# Patient Record
(demographics unavailable — no encounter records)

---

## 2024-11-20 NOTE — HISTORY OF PRESENT ILLNESS
[FreeTextEntry1] : Here for follow up. She reports HERBERT on walking in the park, needs to stop 4-5 times to catch her breath. No CP. + intermittent palps that are brief and rapid. No dizziness. No neurological symptoms. She is concerned that her ILR may be obscuring part of left breast on imaging for breast cancer screening. ILR reports reviewed.

## 2024-11-20 NOTE — DISCUSSION/SUMMARY
[FreeTextEntry1] : EP follow up recommended to review and remove ILR. Exercise nuc stress test to eval for evid of myocardial ischemia Contin current meds incl aspirin and statin.  [EKG obtained to assist in diagnosis and management of assessed problem(s)] : EKG obtained to assist in diagnosis and management of assessed problem(s)

## 2024-12-06 NOTE — ASSESSMENT
[FreeTextEntry1] : The patient is referred for initial consultation for osteoporosis, 12/06/2024.  Osteoporosis - H/o L Breast cancer 2006 (age 46) s/p lumpectomy, RT. Pt was on Tamoxifen x5 years. Post menopausal age 49-50. Switched to Anastrozole but only took for x6 months due to significant aches and pains. Mammogram: UTD including MRI.  - Lung cancer incidentally found on MRI 2018 s/p RUL lobectomy and chemo. - Patient has been told of osteopenia in 2018, drifted to osteoporosis 2024. - L sesamoid fx 8 years ago playing [employer] ball - Denies fhx of osteoporosis  - Supplements with multivitamin and vit D3.  - BMD results were discussed with the patient.   Given the patient's history and BMD, the patient is at an increased risk of future fracture. Options of medical therapy were discussed in detail including risks and benefits.   I discussed Rx with bisphosphonate therapy, including IV Reclast. Risks and benefits of bisphosphonate therapy were discussed including minor aches and pains, heartburn, gastroesophageal irritation (excluding IV Reclast), osteonecrosis of the jaw (ONJ), atypical femur fractures, and acute phase reaction (w/ IV Reclast only). The patient would benefit from bisphosphonate therapy with the expectation of a drug holiday within 3-5 years.   I discussed Rx with twice a year Prolia injection. Risks and benefits of Prolia discussed including ONJ and atypical femur fracture. I discussed that the patient cannot stop Prolia without expecting rapid bone loss and an increase in risk for future fracture unless they transition to another Rx. Furthermore, there is 10-year safety data for Prolia.   All questions were answered. Rx information handout provided. The patient understands and elects to start Fosamax.   I discussed that weight bearing exercises, cardiovascular activities, and diet are beneficial to overall health, but are not adequate for bone density increase or alternative to osteoporosis medication.  Follow up in 3-4 months, virtual OK. Repeat BMD in 1 year after starting therapy.   Scott MS, Erlinda SL, Nicol KL, Iris EM, Jed KG,  AJ, Niraj ES. The clinician's guide to prevention and treatment of osteoporosis. Osteoporosis Int. 2022 Oct;33(10):6969-1897.

## 2024-12-06 NOTE — REASON FOR VISIT
[Consultation] : a consultation visit [Osteoporosis] : osteoporosis [Spouse] : spouse [FreeTextEntry2] : Dr. Sunni Fox

## 2024-12-06 NOTE — PHYSICAL EXAM
[Alert] : alert [No Acute Distress] : no acute distress [Normal Sclera/Conjunctiva] : normal sclera/conjunctiva [EOMI] : extra ocular movement intact [No Proptosis] : no proptosis [No Respiratory Distress] : no respiratory distress [No Accessory Muscle Use] : no accessory muscle use [Normal Rate] : heart rate was normal [No Spinal Tenderness] : no spinal tenderness [No Stigmata of Cushings Syndrome] : no stigmata of Cushings Syndrome [Normal Gait] : normal gait [Normal Strength/Tone] : muscle strength and tone were normal [No Tremors] : no tremors [Oriented x3] : oriented to person, place, and time

## 2024-12-06 NOTE — HISTORY OF PRESENT ILLNESS
[FreeTextEntry1] : CHIEF COMPLAINT: Osteoporosis  HISTORY OF PRESENT ILLNESS:   Patient has been told of osteopenia in 2018, drifted to osteoporosis 2024. I do not have all the bone density data / images available.   Diagnosed with L Breast cancer 2006 (age 46) s/p lumpectomy, RT. Pt was on Tamoxifen x5 years. Post menopausal age 49-50. Switched to Anastrozole but only took for x6 months due to significant aches and pains. Mammogram: UTD including MRI. Lung cancer incidentally found on MRI 2018 s/p RUL lobectomy and chemo. LN -.  Active lifestyle. Exercise: walks 2-3 miles a few times a week, gym, bike rides Fractures: L sesamoid fx 8 years ago playing [employer] ball Frequent falls: denies Assistive device: denies Fhx: denies osteoporosis, parental hip fracture.  Calcium: cheese, yogurt, milk in cereal, almonds. Multivitamin daily.  Vit D: supplements daily, unsure of exact dose. IBS-C, UTD colonoscopy, reportedly normal. Denies history of kidney stones, excessive alcohol intake, excessive soda intake, celiac disease, malabsorption, nutritional deficiencies, GIB, stomach ulcers.   Denies active smoking.   PMHx CVA 2022 unknown cause of clot, tried to close, PFO ASA 81 mg loop recorder PVCs, needs battery change vs removal Denies Paget's Dz, hyperparathyroidism, diabetes, CVD, blood clots, and chronic steroid use.   Sees DDS every 6 months. No major dental work planned.

## 2024-12-06 NOTE — PROCEDURE
[FreeTextEntry1] : Bone Mineral Density: 11/1/2024 (R) Indication: vs 2021 Spine (L1, L2, L3, L4): -2.7, osteoporosis, (-9.2%) Total hip: L -1.5, osteopenia, (-5.5%) / R -1.7, osteopenia, (-5.9%) Femoral neck: L -2.2, osteopenia / R -2.3, osteopenia Proximal radius: not measured  BMD 9/2018 LS -1.6, L TH -1.2, R TH -1.3, L FN -1.5, R FN -1.9

## 2024-12-10 NOTE — DISCUSSION/SUMMARY
[FreeTextEntry1] : In summary, this is a 64-year-old woman with recent CVA and evidence of PFO on MARLO. She has no residual deficits. She had no evidence of AF in the hospital. She s/p ILR placement.  She has had no episodes of A-fib on her loop recorder.  Her breast surgeon is requesting for her loop recorder to be removed given her history of breast cancer in order to have her undergo further imaging.  Given the fact that her battery life is almost at end-of-life, and that she has had no evidence of atrial fibrillation, I think she would benefit from the ILR removal.   Mrs. Baugh appeared to understand the whole discussion and verbalized that all of her questions were answered to her satisfaction.   Thank you for allowing me to be involved in the care of this pleasant woman. Please feel free to contact me with any questions. [EKG obtained to assist in diagnosis and management of assessed problem(s)] : EKG obtained to assist in diagnosis and management of assessed problem(s)

## 2024-12-10 NOTE — CARDIOLOGY SUMMARY
[de-identified] : 2/20/23: Sinus tachycardia at 107 bpm 12/10/2024: Sinus rhythm at 78 bpm, rsR' [de-identified] : 2/23/23 TTE: Normal LV/RV, normal LA/RA. No significant valvular disease. \par  3/6/23 MARLO: Normal LV/RV, no significant valvular disease, evidence of PFO, no thrombus.\par

## 2024-12-10 NOTE — HISTORY OF PRESENT ILLNESS
[FreeTextEntry1] : Referring Physician: Nir Mays MD  Dear Dr. Mays:   Mrs. Baugh was seen in the [facility] today. For our records, please allow me to summarize the history and my findings.   This pleasant 64-year-old woman has a cardiovascular history significant for CVA. She presented to the hospital with left upper extremity weakness and paraesthesias, facial droop, and altered mental status. She was found on MRI to have multiple acute strokes in the right MCA territory, consistent with an embolic event. Her deficits completely resolved. She had no evidence of AF in the hospital. She was discharged on DAPT and high dose statin. She had a MARLO done recently which revealed a PFO.  She underwent ILR placement. She has been doing well since.  He has not had any atrial fibrillation on her device.  Her device battery is close to end-of-life.  She has a history of breast cancer and needs a breast MRI and mammogram.  Her breast surgeon is very concerned that the loop recorder is going to get in the way due to artifact.  She is requesting for the loop recorder to be removed.   Mrs. Baugh denies any recent history of chest pain, shortness of breath, palpitations, dizziness, or syncope.

## 2025-01-14 NOTE — DISCUSSION/SUMMARY
[FreeTextEntry1] : Contin current meds. Reassurance. Follow up 3 mo encouraged inc cardio exercise to improved aerobic conditioning and to call if symptoms worsen or change.

## 2025-01-14 NOTE — HISTORY OF PRESENT ILLNESS
[FreeTextEntry1] : Here for follow up. Recent nuc stress report reviewed with patient and her son in detail. No evid of ischemia. ILR now out. She continues to have intermittent shortness of breath - occurs at rest like during conversation she feels like she needs to take a deep breath. Also while walking has HERBERT.  She has been doing less cardio in winter months. Has chronic musculoskeletal type CP, positional.

## 2025-01-14 NOTE — ASSESSMENT
[FreeTextEntry1] : dyspnea may be multifactorial incl anxiety when at rest, and combination of deconditioning and lung disease with exertion.

## 2025-02-05 NOTE — PHYSICAL EXAM
[FreeTextEntry1] : Constitutional:  Patient was well-developed, well-nourished and in no acute distress.   Head:  Normocephalic, atraumatic. Tympanic membranes were clear.   Neck:  Supple with full range of motion.   Cardiovascular:  Cardiac rhythm was regular without murmur. There were no carotid bruits. Peripheral pulses were full and symmetric.   Respiratory:  Lungs were clear.   Abdomen:  Soft and nontender.   Spine:  Nontender.   Skin:  There were no rashes.   NEUROLOGICAL EXAMINATION:  Mental Status: Patient was alert and oriented. Speech was fluent. There was no dysarthria.   Cranial Nerves:   II: She could finger count bilaterally. Pupils were equal and reactive. Visual fields were full.  Fundi were not visualized.  III, IV, VI:  Eye movements were full without nystagmus.   V: Facial sensation was intact.   VII: Facial strength was normal.   VIII: Hearing was equal.   IX, X: Palatal movement was normal. Phonation was normal.   XI: Sternocleidomastoids and trapezii were normal.   XII: Tongue was midline and movements normal. There was no lingual atrophy or fasciculations.   Motor Examination: Muscle bulk, tone and strength were normal.  Fine finger and rapid alternating movements were normal.  There was a fine bilateral postural tremor in her hands.  There were mild endpoint tremors.  There were no rest tremors.  Sensory Examination: Pinprick, vibration and joint position sense were intact.   Reflexes: DTRs were 2+ throughout.   Plantar Responses: Plantar responses were flexor.   Coordination/Cerebellar Function: There was no dysmetria on finger to nose or heel to shin testing.   Gait/Stance: Gait was normal. Romberg was negative.  Tandem was mildly unsteady.

## 2025-02-05 NOTE — HISTORY OF PRESENT ILLNESS
[FreeTextEntry1] : Mrs. Jamilah Baugh returned to the office having been last seen on October 10, 2023.  She is a 64-year-old right-handed patient who underwent a left lumpectomy followed by radiation and tamoxifen for breast cancer in 2006.  In 2018, she underwent a left upper lobectomy for presumed adenocarcinoma with local spread.  She was treated with chemotherapy possibly including carboplatin.  She underwent serial scans without evidence of recurrence.  She is under the care of a [facility] oncologist Nir Freitas.  She was well until 2023 when she developed sudden onset of blurred vision and vertical diplopia.  Her symptoms resolved for the most part over 10 minutes although she felt that her right arm was lagging.  She had a mild right frontal headache.  She was evaluated in the emergency room and underwent a CT of the brain which was unremarkable.  She was discharged.  On 2023, while trying on skin goggles, she experienced sudden mental slowness.  She did not appear to be answering appropriately.  Her symptoms resolved.  On , she experienced difficulty dressing and then developed left arm weakness and numbness.  She was transported to [facility].  CT of the brain with contrast revealed a moderate ischemic penumbra in the right frontoparietal region.  CT angiography revealed a 40 to 55% stenosis of the M3 branches in the superior division.  There was no other large vessel occlusive disease.  She was treated with clopidogrel and aspirin.  Her symptoms remitted.  She had mild residual left hand tingling.  She had right-sided headache and felt a bit foggy.   MRI of the brain revealed multiple small acute right middle cerebral artery infarcts.  Echocardiogram revealed a minimally thickened mitral valve with trace mitral regurgitation and tricuspid regurgitation.  There was a questionable intra-atrial septal aneurysm.  MARLO revealed a patent foramen ovale.  She has an implanted loop recorder which has not revealed evidence of paroxysmal atrial fibrillation.  She is scheduled to see Dr. Jorge Iqbal on  regarding possible PFO closure.  At her 2023 visit, she complained of occasional brief stabbing pains on the right side of her head possibly once a week lasting up to a minute.  She denied any sensory or motor symptoms but has been noted to sometimes cradled her left arm against her chest.  An MRI of the brain performed in March revealed evolving right-sided strokes.  MRA of the brain was negative.  In 2023, she underwent an unsuccessful PFO closure.  It was decided to treat her medically with aspirin and rosuvastatin instead.  She has been free of cerebrovascular symptoms since that time.  Her implanted loop recorder was read removed after 19 months without any definite evidence of paroxysmal atrial fibrillation.  A few months ago, she underwent an exercise nuclear stress test.  There was a report of a brief bout of atrial fibrillation during that procedure but it was never confirmed.  She complains of an action tremor in her right hand.  She denies tremor of her voice or head.  Her maternal half brother suffers from tremor as does her son.  Her father and sister do not suffer from tremor.  Her tremor is aggravated by use of her inhaler.  Past surgical history is notable for right upper lobectomy, left lumpectomy and  section.  She suffers from palpitations, IBS and hyperlipidemia.  There is no history of hypertension, diabetes, coronary, renal, hepatic, thyroid or hematologic disease.  She has an allergy to Ceclor.  Medications include Anoro Ellipta, rosuvastatin 40 mg daily, metoprolol 25 mg daily and aspirin 81 mg/day.  She is a remote smoker but was exposed to much secondhand smoke.  She is a social drinker.  She works as a practice manager and is .  Family history is notable for mother with lung cancer, hyperlipidemia and possibly coronary disease.  Her father suffered from hyperlipidemia and bladder cancer.  Her brother suffers from coronary disease.

## 2025-02-05 NOTE — CONSULT LETTER
[Dear  ___] : Dear  [unfilled], [Consult Letter:] : I had the pleasure of evaluating your patient, [unfilled]. [Please see my note below.] : Please see my note below. [Consult Closing:] : Thank you very much for allowing me to participate in the care of this patient.  If you have any questions, please do not hesitate to contact me. [Sincerely,] : Sincerely, [FreeTextEntry3] : Adrian Fry MD\par   [DrGiles  ___] : Dr. MATT

## 2025-02-05 NOTE — REVIEW OF SYSTEMS
[Decr. Concentrating Ability] : decreased concentrating ability [Numbness] : numbness [Tingling] : tingling [Negative] : Heme/Lymph

## 2025-02-05 NOTE — ASSESSMENT
[FreeTextEntry1] : Mrs. Gramajo is a 64-year-old with a remote history of breast and lung cancer.  She has a history of cryptogenic strokes for which she is maintained on aspirin and rosuvastatin without recent recurrence of symptoms.  She underwent an attempted but unsuccessful PFO closure.  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  I suggested a routine follow-up visit in fall 2025.  If her tremor worsens, symptomatic treatment can be considered with increasing her beta-blocker or addition of primidone.  Further management will depend upon her clinical course.

## 2025-03-18 NOTE — ASSESSMENT
Retinal tear and detachment warning symptoms reviewed and patient instructed to call immediately if increasing floaters, flashes, or decreasing peripheral vision. [FreeTextEntry1] : Initial consultation for osteoporosis, 12/06/2024. Seen for follow up.  Osteoporosis - H/o L Breast cancer 2006 (age 46) s/p lumpectomy, RT. Pt was on Tamoxifen x5 years. Post menopausal age 49-50. Switched to Anastrozole but only took for x6 months due to significant aches and pains. Mammogram: UTD including MRI.  - Lung cancer incidentally found on MRI 2018 s/p RUL lobectomy and chemo. - Patient has been told of osteopenia in 2018, drifted to osteoporosis 2024. - L sesamoid fx 8 years ago playing [employer] ball - Denies fhx of osteoporosis  - Supplements with multivitamin and vit D3.  - BMD results were discussed with the patient.   Given the patient's history and BMD, the patient is at an increased risk of future fracture. Options of medical therapy were discussed in detail including risks and benefits.   Pt elected Alendronate. Taking correctly, tolerating well. No ONJ or AFF.   I discussed that weight bearing exercises, cardiovascular activities, and diet are beneficial to overall health, but are not adequate for bone density increase or alternative to osteoporosis medication.  Follow up and repeat BMD in 1 year after starting therapy.  Scott MS, Erlinda SL, Nicol KL, Iris EM, Jed KG,  AJ, Niraj ES. The clinician's guide to prevention and treatment of osteoporosis. Osteoporosis Int. 2022 Oct;33(10):0728-6372.

## 2025-03-18 NOTE — HISTORY OF PRESENT ILLNESS
[FreeTextEntry1] : Patient has been told of osteopenia in 2018, drifted to osteoporosis 2024. I do not have all the bone density data / images available.   Diagnosed with L Breast cancer 2006 (age 46) s/p lumpectomy, RT. Pt was on Tamoxifen x5 years (?2011). Post menopausal age 49-50.  Switched to Anastrozole but only took for x6 months due to significant aches and pains. Mammogram: UTD including MRI. Lung cancer incidentally found on MRI 2018 s/p RUL lobectomy and chemo. LN -  Active lifestyle. Exercise: walks 2-3 miles a few times a week, gym, bike rides Fractures: L sesamoid fx 8 years ago playing [employer] ball Frequent falls: denies Assistive device: denies Fhx: denies osteoporosis, parental hip fracture.  Calcium: cheese, yogurt, milk in cereal, almonds. Multivitamin daily.  Vit D: supplements daily, unsure of exact dose. IBS-C, UTD colonoscopy, reportedly normal. Denies history of kidney stones, excessive alcohol intake, excessive soda intake, celiac disease, malabsorption, nutritional deficiencies, GIB, stomach ulcers.   Denies active smoking.   PMHx CVA 2022 unknown cause of clot, tried to close, PFO ASA 81 mg loop recorder PVCs, needs battery change vs removal Denies Paget's Dz, hyperparathyroidism, diabetes, CVD, blood clots, and chronic steroid use.   03/18/2025 The patient has been on Alendronate/Fosamax since 12/2024. The patient is taking it correctly and tolerating it well with no UGI sx. Denies jaw pain. Last DDS within the past 6 months, no ONJ, not planning any major dental work. Denies thigh pain. No interval falls or fracture. No interval surgery, hospitalizations or new medications.

## 2025-03-18 NOTE — REASON FOR VISIT
[Follow - Up] : a follow-up visit [Osteoporosis] : osteoporosis [Home] : at home, [unfilled] , at the time of the visit. [Medical Office: ([facility])___] : at the medical office located in  [Telehealth (audio & video)] : This visit was provided via telehealth using real-time 2-way audio visual technology. [Verbal consent obtained from patient] : the patient, [unfilled] [FreeTextEntry2] : Dr. Sunni Fox